# Patient Record
Sex: FEMALE | Race: WHITE | NOT HISPANIC OR LATINO | Employment: UNEMPLOYED | URBAN - METROPOLITAN AREA
[De-identification: names, ages, dates, MRNs, and addresses within clinical notes are randomized per-mention and may not be internally consistent; named-entity substitution may affect disease eponyms.]

---

## 2020-05-20 ENCOUNTER — TRANSCRIBE ORDERS (OUTPATIENT)
Dept: ADMINISTRATIVE | Facility: HOSPITAL | Age: 75
End: 2020-05-20

## 2020-05-20 DIAGNOSIS — R10.9 STOMACH ACHE: Primary | ICD-10-CM

## 2020-05-30 ENCOUNTER — HOSPITAL ENCOUNTER (OUTPATIENT)
Dept: CT IMAGING | Facility: HOSPITAL | Age: 75
Discharge: HOME | End: 2020-05-30
Payer: MEDICARE

## 2020-05-30 DIAGNOSIS — R10.9 STOMACH ACHE: ICD-10-CM

## 2020-05-30 PROCEDURE — 74177 CT ABD & PELVIS W/CONTRAST: CPT

## 2020-05-30 RX ADMIN — IOHEXOL 100 ML: 350 INJECTION, SOLUTION INTRAVENOUS at 11:41

## 2021-03-03 DIAGNOSIS — Z23 ENCOUNTER FOR IMMUNIZATION: ICD-10-CM

## 2021-03-18 ENCOUNTER — IMMUNIZATIONS (OUTPATIENT)
Dept: FAMILY MEDICINE CLINIC | Facility: HOSPITAL | Age: 76
End: 2021-03-18

## 2021-03-18 DIAGNOSIS — Z23 ENCOUNTER FOR IMMUNIZATION: Primary | ICD-10-CM

## 2021-03-18 PROCEDURE — 0001A SARS-COV-2 / COVID-19 MRNA VACCINE (PFIZER-BIONTECH) 30 MCG: CPT

## 2021-03-18 PROCEDURE — 91300 SARS-COV-2 / COVID-19 MRNA VACCINE (PFIZER-BIONTECH) 30 MCG: CPT

## 2021-04-12 ENCOUNTER — IMMUNIZATIONS (OUTPATIENT)
Dept: FAMILY MEDICINE CLINIC | Facility: HOSPITAL | Age: 76
End: 2021-04-12

## 2021-04-12 DIAGNOSIS — Z23 ENCOUNTER FOR IMMUNIZATION: Primary | ICD-10-CM

## 2021-04-12 PROCEDURE — 91300 SARS-COV-2 / COVID-19 MRNA VACCINE (PFIZER-BIONTECH) 30 MCG: CPT

## 2021-04-12 PROCEDURE — 0002A SARS-COV-2 / COVID-19 MRNA VACCINE (PFIZER-BIONTECH) 30 MCG: CPT

## 2023-02-17 LAB — HBA1C MFR BLD HPLC: 5.6 %

## 2023-04-20 PROBLEM — N90.4 LICHEN SCLEROSUS ET ATROPHICUS OF THE VULVA: Status: ACTIVE | Noted: 2023-04-20

## 2023-04-20 PROBLEM — Z12.31 ENCOUNTER FOR SCREENING MAMMOGRAM FOR MALIGNANT NEOPLASM OF BREAST: Status: ACTIVE | Noted: 2023-04-20

## 2023-04-20 PROBLEM — Z01.411 ENCOUNTER FOR GYNECOLOGICAL EXAMINATION WITH ABNORMAL FINDING: Status: ACTIVE | Noted: 2023-04-20

## 2023-04-20 PROBLEM — Z80.3 FAMILY HISTORY OF BREAST CANCER: Status: ACTIVE | Noted: 2023-04-20

## 2023-04-20 PROBLEM — Z78.0 OSTEOPENIA AFTER MENOPAUSE: Status: ACTIVE | Noted: 2023-04-20

## 2023-04-20 PROBLEM — Z78.0 POSTMENOPAUSAL: Status: ACTIVE | Noted: 2023-04-20

## 2023-04-20 PROBLEM — M85.80 OSTEOPENIA AFTER MENOPAUSE: Status: ACTIVE | Noted: 2023-04-20

## 2023-07-20 ENCOUNTER — OFFICE VISIT (OUTPATIENT)
Dept: OBGYN CLINIC | Facility: CLINIC | Age: 78
End: 2023-07-20
Payer: MEDICARE

## 2023-07-20 VITALS
HEIGHT: 61 IN | SYSTOLIC BLOOD PRESSURE: 110 MMHG | WEIGHT: 182.2 LBS | BODY MASS INDEX: 34.4 KG/M2 | DIASTOLIC BLOOD PRESSURE: 68 MMHG

## 2023-07-20 DIAGNOSIS — Z78.0 POSTMENOPAUSAL: ICD-10-CM

## 2023-07-20 DIAGNOSIS — N90.4 LICHEN SCLEROSUS ET ATROPHICUS OF THE VULVA: Primary | ICD-10-CM

## 2023-07-20 PROCEDURE — 99213 OFFICE O/P EST LOW 20 MIN: CPT | Performed by: OBSTETRICS & GYNECOLOGY

## 2023-07-20 RX ORDER — TRIAMCINOLONE ACETONIDE 1 MG/G
CREAM TOPICAL
COMMUNITY
Start: 2023-05-01

## 2023-07-20 NOTE — PATIENT INSTRUCTIONS
If flare up Apply twice daily sparingly  In  3 swipes  For  3  Weeks. Week  4 off. Weeks  5-7 once a day at bedtime, week 8 off, weeks 9-11 every other day and week 12 off. May use Aquaphor between. Now  make sure you apply at least once a day put Aquaphor on! Change your pads at least every 3-4 hours. Keep clean and dry. You need to do maintenance of your  steroid ointment   ! Use  in 3 swipes   2-3 x weekly  3 weeks on one week off.

## 2023-07-20 NOTE — PROGRESS NOTES
PROBLEM GYNECOLOGICAL VISIT    Corwin Baires is a 69 y.o. female who presents today for gyn  Follow up. Her general medical history has been reviewed and she reports it as follows:    Past Medical History:   Diagnosis Date   • Anxiety    • Atrial tachycardia ([address])    • Bundle branch block    • Cataracts, bilateral    • Depression    • H/O gastroesophageal reflux (GERD)    • History of screening mammography 2019    Bi-Rads 1.   • Hypothyroidism    • Lichen sclerosus    • Miscarriage    • Osteopenia     prior tx w  [facility]   • LATRICE (stress urinary incontinence, female)     mild   • Varicella childhood     Past Surgical History:   Procedure Laterality Date   • BREAST BIOPSY     • CATARACT EXTRACTION BILATERAL W/ ANTERIOR VITRECTOMY  2023   •  SECTION N/A    • CHOLECYSTECTOMY N/A 2016   • DILATION AND CURETTAGE OF UTERUS N/A    • TONSILECTOMY AND ADNOIDECTOMY N/A      OB History    No obstetric history on file. Social History     Tobacco Use   • Smoking status: Never   • Smokeless tobacco: Never   Vaping Use   • Vaping Use: Never used   Substance Use Topics   • Alcohol use: Not Currently   • Drug use: Never       Current Outpatient Medications   Medication Instructions   • atorvastatin (LIPITOR) 20 mg tablet TAKE 1 TABLET AT BEDTIME for 90   • Difluprednate 0.05 % EMUL INSTILL 1 DROP INTO LEFT EYE 4 TIMES DAILY FOR 2 WEEKS   • escitalopram (LEXAPRO) 5 mg tablet No dose, route, or frequency recorded. • fluticasone (FLONASE) 50 mcg/act nasal spray As needed   • ketorolac (ACULAR) 0.4 % SOLN No dose, route, or frequency recorded. • levothyroxine 75 mcg tablet No dose, route, or frequency recorded. • meloxicam (MOBIC) 7.5 mg, Oral, Daily PRN   • metoprolol tartrate (LOPRESSOR) 50 mg tablet 2 times daily   • montelukast (SINGULAIR) 10 mg tablet As needed   • pantoprazole (PROTONIX) 40 mg tablet No dose, route, or frequency recorded.    • triamcinolone (KENALOG) 0.1 % cream APPLY TWICE DAILY FOR 2 WEEKS THEN OFF FOR 1 WEEK. REPEAT AS NEEDED TO ECZEMA ON ARMS   • triamcinolone (KENALOG) 0.5 % ointment Topical, 2 times daily, Apply twice daily sparingly  In  3 swipes  For  3  Weeks. Week  4 off. Weeks  5-7 once a day at bedtime, week 8 off, weeks 9-11 every other day and week 12 off. May use Aquaphor between. History of Present Illness:   Pt here for gyn fu + hx of  L/S. Using steroid ointment   When  Sore. Itching and soreness can wax and wane   + rectal incontinence     Review of Systems:  Review of Systems   Gastrointestinal: Negative. Rectal  Incontinence  occasional     Genitourinary: Negative for difficulty urinating, dysuria, vaginal bleeding and vaginal discharge. Vulvar itching     Neurological: Negative. Psychiatric/Behavioral: Negative. Physical Exam:  /68   Ht 5' 0.5" (1.537 m)   Wt 82.6 kg (182 lb 3.2 oz)   Breastfeeding No   BMI 35.00 kg/m²   Physical Exam  Genitourinary:      Bladder, rectum and urethral meatus normal.      No lesions in the vagina. Right Labia: No rash, tenderness, lesions or skin changes. Left Labia: skin changes. Left Labia: No tenderness, lesions or rash. No inguinal adenopathy present in the right side. Pelvic Jose Score: 4/5. No vaginal discharge, erythema, tenderness, bleeding, ulceration or granulation tissue. No vaginal prolapse present. Moderate vaginal atrophy present. No urethral prolapse, tenderness or mass present. Pelvic exam was performed with patient in the lithotomy position. Abdominal:      General: Abdomen is flat. Palpations: Abdomen is soft. Hernia: There is no hernia in the left inguinal area or right inguinal area. Lymphadenopathy:      Lower Body: No right inguinal adenopathy. Neurological:      Mental Status: She is alert.        I have spent a total time of 23 minutes on 07/20/23 in caring for this patient including Risks and benefits of tx options, Instructions for management, Patient and family education, Importance of tx compliance and Documenting in the medical record. Assessment:   1. Post menopausal ,  Lichen sclerosis ,   Pelvic floor relaxation     Plan:  flare up Apply twice daily sparingly  In  3 swipes  For  3  Weeks. Week  4 off. Weeks  5-7 once a day at bedtime, week 8 off, weeks 9-11 every other day and week 12 off. May use Aquaphor between. Now  make sure you apply at least once a day put Aquaphor on! Change your pads at least every 3-4 hours. Keep clean and dry. You need to do maintenance of your  steroid ointment   ! Use  in 3 swipes   2-3 x weekly  3 weeks on one week of  fu in  6 mo  Or  Prn      Reviewed with patient that test results are [facility] immediately, but that they will not necessarily be reviewed by me immediately. Explained that I will review results at my earliest opportunity and contact patient appropriately.

## 2023-09-20 LAB — HBA1C MFR BLD HPLC: 5.7 %

## 2023-09-25 ENCOUNTER — CONSULT (OUTPATIENT)
Age: 78
End: 2023-09-25
Payer: MEDICARE

## 2023-09-25 ENCOUNTER — TELEPHONE (OUTPATIENT)
Age: 78
End: 2023-09-25

## 2023-09-25 VITALS
DIASTOLIC BLOOD PRESSURE: 82 MMHG | WEIGHT: 182.4 LBS | BODY MASS INDEX: 34.44 KG/M2 | HEIGHT: 61 IN | SYSTOLIC BLOOD PRESSURE: 128 MMHG

## 2023-09-25 DIAGNOSIS — R19.4 CHANGE IN BOWEL HABIT: Primary | ICD-10-CM

## 2023-09-25 DIAGNOSIS — Z79.899 ENCOUNTER FOR LONG-TERM CURRENT USE OF MEDICATION: ICD-10-CM

## 2023-09-25 DIAGNOSIS — K62.5 RECTAL BLEEDING: ICD-10-CM

## 2023-09-25 DIAGNOSIS — K21.9 GASTROESOPHAGEAL REFLUX DISEASE WITHOUT ESOPHAGITIS: ICD-10-CM

## 2023-09-25 PROCEDURE — 99203 OFFICE O/P NEW LOW 30 MIN: CPT | Performed by: INTERNAL MEDICINE

## 2023-09-25 NOTE — PATIENT INSTRUCTIONS
[facility] - Outpatient Consultation  Khalida Coker 69 y.o. female MRN: [id number]  Encounter: [id number]    ASSESSMENT AND PLAN:      1. Change in bowel habit  --Patient with a change in bowel habits since about February of this year. She has had some episodes sporadically of fecal incontinence. This has been a new occurrence. Last colonoscopy was about 8 years or so. She is never had polyps. No known family history of colon cancer but patient was adopted. -With about 2-3 loose bowel movements per day. Change in caliber of stool. Differential diagnosis could include microscopic colitis. Patient is on a PPI so this could affect things. Certainly colon neoplasm needs to be excluded    - Colonoscopy; Future  -Difficulties with prep so we will give her MiraLAX prep  -Risk and benefits of colonoscopy reviewed with the patient    2. Rectal bleeding  --Patient with rectal bleeding fairly frequently. She does have discomfort with sitting down. This occurs several times a week. Patient describes scant rectal bleeding. She states "I know I have hemorrhoids. `~"  -Rectal examination today reveals normal tone, no masses, grade 2 internal hemorrhoids      3. Gastroesophageal reflux disease without esophagitis  --On long-term PPI. If she goes off the PPI she has significant heartburn    4. Encounter for long-term current use of medication  --Patient on Mobic for diffuse arthritis along with long-term PPI.   Each of these can affect bowel function  -Protonix can be protective in patients on long-term NSAIDs      Followup Appointment:  4 mo

## 2023-09-25 NOTE — TELEPHONE ENCOUNTER
Scheduled date of colonoscopy (as of today):11/16/23  Physician performing colonoscopy:SKINNY  Location of colonoscopy:[facility]  Bowel prep reviewed with patient:Miralax/Dulcolax  Instructions reviewed with patient by:JANINE  Clearances: N

## 2023-09-25 NOTE — PROGRESS NOTES
[facility] [place] [facility] - Outpatient Consultation  Balwinder Su 69 y.o. female MRN: [id number]  Encounter: [id number]    ASSESSMENT AND PLAN:      1. Change in bowel habit  --Patient with a change in bowel habits since about February of this year. She has had some episodes sporadically of fecal incontinence. This has been a new occurrence. Last colonoscopy was about 8 years or so. She is never had polyps. No known family history of colon cancer but patient was adopted. -With about 2-3 loose bowel movements per day. Change in caliber of stool. Differential diagnosis could include microscopic colitis. Patient is on a PPI so this could affect things. Certainly colon neoplasm needs to be excluded    - Colonoscopy; Future  -Difficulties with prep so we will give her MiraLAX prep  -Risk and benefits of colonoscopy reviewed with the patient    2. Rectal bleeding  --Patient with rectal bleeding fairly frequently. She does have discomfort with sitting down. This occurs several times a week. Patient describes scant rectal bleeding. She states "I know I have hemorrhoids. `~"  -Rectal examination today reveals normal tone, no masses, grade 2 internal hemorrhoids      3. Gastroesophageal reflux disease without esophagitis  --On long-term PPI. If she goes off the PPI she has significant heartburn    4. Encounter for long-term current use of medication  --Patient on Mobic for diffuse arthritis along with long-term PPI. Each of these can affect bowel function  -Protonix can be protective in patients on long-term NSAIDs      Followup Appointment:  4 mo   ______________________________________________________________________    Chief Complaint   Patient presents with   • Diarrhea     Pt states that she started to experience episodes of urgent diarrhea in February. Pt has had diarrhea off/on, not always able to make it to the bathroom. PCP recommended fiber supplement.  Pt also noted her bowel movements are long and thin and she does see fresh blood. HPI:   Uzma Mclean is a 69y.o. year old female who presents for evaluation of recent change in bowel habit and rectal bleeding. Patient reports since the early part of this year specifically February she has noticed some more frequency of her bowel movements. Previously having 1-2 bowel movements a day now 2-3. In addition she reports her stool is looser and she has a sense of urgency. She feels that things are getting better. She has had some episodes of fecal incontinence. In addition patient reports rectal bleeding. This is scant in amount. She denies any problems with abdominal pain. She ports some discomfort with sitting around the anal area and states that "I know I have hemorrhoids". Patient has had colorectal cancer screening. Last examination over 8 years ago which was only revealing for hemorrhoids. Family history largely unknown. Patient knows the history of her birth mother. Patient does have a previous history of GERD. This is controlled with PPI. If she goes off the medication she will have issues.   She is also on Mobic for chronic arthritis problems    Historical Information   Past Medical History:   Diagnosis Date   • Anxiety    • Atrial tachycardia ([address])    • Bundle branch block    • Cataracts, bilateral    • Depression    • H/O gastroesophageal reflux (GERD)    • History of screening mammography 2019    Bi-Rads 1.   • Hypothyroidism    • Irritable bowel syndrome    • Lichen sclerosus    • Miscarriage    • Osteopenia     prior tx w  [facility]   • LATRICE (stress urinary incontinence, female)     mild   • Varicella childhood     Past Surgical History:   Procedure Laterality Date   • BREAST BIOPSY     • CATARACT EXTRACTION BILATERAL W/ ANTERIOR VITRECTOMY  2023   •  SECTION N/A    • CHOLECYSTECTOMY N/A 2016   • COLONOSCOPY N/A    • DILATION AND CURETTAGE OF UTERUS N/A    • TONSILECTOMY AND ADNOIDECTOMY N/A 1960     Social History     Substance and Sexual Activity   Alcohol Use Not Currently     Social History     Substance and Sexual Activity   Drug Use Never     Social History     Tobacco Use   Smoking Status Never   Smokeless Tobacco Never     Family History   Adopted: Yes   Problem Relation Age of Onset   • Uterine cancer Mother 36   • Breast cancer Mother    • Ovarian cancer Neg Hx    • Colon cancer Neg Hx        Meds/Allergies     Current Outpatient Medications:   •  atorvastatin (LIPITOR) 20 mg tablet  •  escitalopram (LEXAPRO) 5 mg tablet  •  levothyroxine 75 mcg tablet  •  meloxicam (MOBIC) 7.5 mg tablet  •  metoprolol tartrate (LOPRESSOR) 50 mg tablet  •  pantoprazole (PROTONIX) 40 mg tablet  •  triamcinolone (KENALOG) 0.1 % cream  •  triamcinolone (KENALOG) 0.5 % ointment  •  fluticasone (FLONASE) 50 mcg/act nasal spray    Allergies   Allergen Reactions   • Penicillins Hives       PHYSICAL EXAM:    Blood pressure 128/82, height 5' 0.5" (1.537 m), weight 82.7 kg (182 lb 6.4 oz), not currently breastfeeding. Body mass index is 35.04 kg/m². General Appearance: NAD, cooperative, alert  Eyes: Anicteric, conjunctiva pink  ENT:  Normocephalic, atraumatic, normal mucosa. Neck:  Supple, symmetrical, trachea midline,   Resp:  Clear to auscultation bilaterally; no rales, rhonchi or wheezing; respirations unlabored   CV:  S1 S2, Regular rate and rhythm; no murmur, rub, or gallop. GI:  Soft, non-tender, non-distended; normal bowel sounds; no masses, no organomegaly   Rectal: Rectal exam no masses. Good tone. Grade 2 internal hemorrhoids  Musculoskeletal: No cyanosis, clubbing or edema. Normal ROM.   Skin:  No jaundice, rashes, or lesions   Heme/Lymph: No palpable cervical lymphadenopathy  Psych: Normal affect, good eye contact  Neuro: No gross deficits, AAOx3    Lab Results:   -Laboratory studies February 2023 CBC normal with a hemoglobin of 14      REVIEW OF SYSTEMS:    CONSTITUTIONAL: Denies any fever, chills, rigors, and weight loss. Positive for fatigue  HEENT: No earache or tinnitus. Denies hearing loss or visual disturbances. CARDIOVASCULAR: No chest pain, positive for palpitations  RESPIRATORY: Observe for recent cough hemoptysis, positive for shortness of breath or dyspnea on exertion--1 flight of steps. GASTROINTESTINAL: As noted in the History of Present Illness. GENITOURINARY: No problems with urination. Denies any hematuria or dysuria. NEUROLOGIC: No dizziness or vertigo, denies headaches. MUSCULOSKELETAL: Diffuse arthritis and arthralgias  SKIN: Denies skin rashes or itching. ENDOCRINE: Denies excessive thirst. Denies intolerance to heat or cold. PSYCHOSOCIAL: Denies depression or anxiety. Denies any recent memory loss.

## 2023-10-26 LAB
CREAT ?TM UR-SCNC: 69 UMOL/L
EXT ALBUMIN URINE RANDOM: 0.6
MICROALBUMIN/CREAT UR: 9 MG/G{CREAT}

## 2023-11-02 ENCOUNTER — ANESTHESIA (OUTPATIENT)
Dept: ANESTHESIOLOGY | Facility: AMBULATORY SURGERY CENTER | Age: 78
End: 2023-11-02

## 2023-11-02 ENCOUNTER — ANESTHESIA EVENT (OUTPATIENT)
Dept: ANESTHESIOLOGY | Facility: AMBULATORY SURGERY CENTER | Age: 78
End: 2023-11-02

## 2023-11-08 ENCOUNTER — TELEPHONE (OUTPATIENT)
Dept: GASTROENTEROLOGY | Facility: CLINIC | Age: 78
End: 2023-11-08

## 2023-11-08 NOTE — TELEPHONE ENCOUNTER
No response from [employer]. Procedure confirmed  Colonoscopy     Via: Spoke with patient. Instructions given: Given to Patient at Visit     Prep Given: Miralax/Dulcolax    Call the office if there are any questions.

## 2023-11-14 ENCOUNTER — NURSE TRIAGE (OUTPATIENT)
Age: 78
End: 2023-11-14

## 2023-11-14 NOTE — TELEPHONE ENCOUNTER
Pt called in with minor cough that started yesterday and she is scheduled for colon 11/16. She denies fever, SOB, or other alarming symptoms. Pt will continue to monitor and call back if anything changes but pt is to proceed with colon as scheduled.

## 2023-11-16 ENCOUNTER — ANESTHESIA EVENT (OUTPATIENT)
Dept: GASTROENTEROLOGY | Facility: AMBULATORY SURGERY CENTER | Age: 78
End: 2023-11-16

## 2023-11-16 ENCOUNTER — ANESTHESIA (OUTPATIENT)
Dept: GASTROENTEROLOGY | Facility: AMBULATORY SURGERY CENTER | Age: 78
End: 2023-11-16

## 2023-11-16 ENCOUNTER — HOSPITAL ENCOUNTER (OUTPATIENT)
Dept: GASTROENTEROLOGY | Facility: AMBULATORY SURGERY CENTER | Age: 78
Discharge: HOME | End: 2023-11-16
Payer: MEDICARE

## 2023-11-16 VITALS
RESPIRATION RATE: 18 BRPM | OXYGEN SATURATION: 96 % | BODY MASS INDEX: 35.34 KG/M2 | WEIGHT: 180 LBS | HEART RATE: 61 BPM | TEMPERATURE: 99.6 F | DIASTOLIC BLOOD PRESSURE: 65 MMHG | SYSTOLIC BLOOD PRESSURE: 141 MMHG | HEIGHT: 60 IN

## 2023-11-16 DIAGNOSIS — R19.4 CHANGE IN BOWEL HABIT: ICD-10-CM

## 2023-11-16 PROBLEM — E03.9 HYPOTHYROIDISM: Status: ACTIVE | Noted: 2023-11-16

## 2023-11-16 PROBLEM — F41.9 ANXIETY: Status: ACTIVE | Noted: 2023-11-16

## 2023-11-16 PROBLEM — I47.19 ATRIAL TACHYCARDIA: Status: ACTIVE | Noted: 2023-11-16

## 2023-11-16 PROCEDURE — 45381 COLONOSCOPY SUBMUCOUS NJX: CPT | Performed by: INTERNAL MEDICINE

## 2023-11-16 PROCEDURE — 45380 COLONOSCOPY AND BIOPSY: CPT | Performed by: INTERNAL MEDICINE

## 2023-11-16 PROCEDURE — 88305 TISSUE EXAM BY PATHOLOGIST: CPT | Performed by: PATHOLOGY

## 2023-11-16 PROCEDURE — 45385 COLONOSCOPY W/LESION REMOVAL: CPT | Performed by: INTERNAL MEDICINE

## 2023-11-16 RX ORDER — SODIUM CHLORIDE, SODIUM LACTATE, POTASSIUM CHLORIDE, CALCIUM CHLORIDE 600; 310; 30; 20 MG/100ML; MG/100ML; MG/100ML; MG/100ML
50 INJECTION, SOLUTION INTRAVENOUS CONTINUOUS
Status: DISCONTINUED | OUTPATIENT
Start: 2023-11-16 | End: 2023-11-20 | Stop reason: HOSPADM

## 2023-11-16 RX ORDER — SODIUM CHLORIDE, SODIUM LACTATE, POTASSIUM CHLORIDE, CALCIUM CHLORIDE 600; 310; 30; 20 MG/100ML; MG/100ML; MG/100ML; MG/100ML
INJECTION, SOLUTION INTRAVENOUS CONTINUOUS PRN
Status: DISCONTINUED | OUTPATIENT
Start: 2023-11-16 | End: 2023-11-16

## 2023-11-16 RX ORDER — PROPOFOL 10 MG/ML
INJECTION, EMULSION INTRAVENOUS AS NEEDED
Status: DISCONTINUED | OUTPATIENT
Start: 2023-11-16 | End: 2023-11-16

## 2023-11-16 RX ADMIN — PROPOFOL 50 MG: 10 INJECTION, EMULSION INTRAVENOUS at 13:13

## 2023-11-16 RX ADMIN — PROPOFOL 100 MG: 10 INJECTION, EMULSION INTRAVENOUS at 13:05

## 2023-11-16 RX ADMIN — SODIUM CHLORIDE, SODIUM LACTATE, POTASSIUM CHLORIDE, CALCIUM CHLORIDE 50 ML/HR: 600; 310; 30; 20 INJECTION, SOLUTION INTRAVENOUS at 12:56

## 2023-11-16 RX ADMIN — PROPOFOL 50 MG: 10 INJECTION, EMULSION INTRAVENOUS at 13:07

## 2023-11-16 RX ADMIN — SODIUM CHLORIDE, SODIUM LACTATE, POTASSIUM CHLORIDE, CALCIUM CHLORIDE: 600; 310; 30; 20 INJECTION, SOLUTION INTRAVENOUS at 13:03

## 2023-11-16 RX ADMIN — PROPOFOL 50 MG: 10 INJECTION, EMULSION INTRAVENOUS at 13:09

## 2023-11-16 NOTE — ANESTHESIA POSTPROCEDURE EVALUATION
Post-Op Assessment Note    CV Status:  Stable    Pain management: adequate       Mental Status:  Alert and awake   Hydration Status:  Euvolemic   PONV Controlled:  Controlled   Airway Patency:  Patent     Post Op Vitals Reviewed: Yes    No anethesia notable event occurred.     Staff: Anesthesiologist, CRNA               BP   135/63   Temp      Pulse  65   Resp   14   SpO2   100

## 2023-11-16 NOTE — ANESTHESIA PREPROCEDURE EVALUATION
Procedure:  COLONOSCOPY    Relevant Problems   CARDIO   (+) Atrial tachycardia      ENDO   (+) Hypothyroidism      GI/HEPATIC   (+) Gastroesophageal reflux disease without esophagitis   (+) Rectal bleeding      NEURO/PSYCH   (+) Anxiety        Physical Exam    Airway    Mallampati score: III  TM Distance: >3 FB  Neck ROM: full     Dental   No notable dental hx     Cardiovascular  Cardiovascular exam normal    Pulmonary  Pulmonary exam normal     Other Findings  post-pubertal.      Anesthesia Plan  ASA Score- 2     Anesthesia Type- IV sedation with anesthesia with ASA Monitors. Additional Monitors:     Airway Plan:     Comment: I discussed risks (reviewed with patient on the anesthesia consent form), benefits and alternatives of monitored sedation including the possibility under sedation to have recall or mild discomfort. .       Plan Factors-    Chart reviewed. Patient summary reviewed. Induction- intravenous. Postoperative Plan-     Informed Consent- Anesthetic plan and risks discussed with patient. I personally reviewed this patient with the CRNA. Discussed and agreed on the Anesthesia Plan with the CRNA. Cecily Raygoza

## 2023-11-16 NOTE — PROGRESS NOTES
Procedure results reviewed with patient by Dr Arlette Rosas. Pt given written and verbal d/c  instructions.

## 2023-11-16 NOTE — H&P
History and Physical - [facility]  Tavo Hartman 69 y.o. female MRN: [id number]    HPI: Tavo Hartman is a 69y.o. year old female who presents for diagnostic colonoscopy. Patient has recent change in bowel habit and recent rectal bleeding    REVIEW OF SYSTEMS: Per the HPI, and otherwise unremarkable. Historical Information   Past Medical History:   Diagnosis Date    Anxiety     Atrial tachycardia     Bundle branch block     Cataracts, bilateral     Depression     Disease of thyroid gland     GERD (gastroesophageal reflux disease)     H/O gastroesophageal reflux (GERD)     History of screening mammography 2019    Bi-Rads 1.     Hypothyroidism     Irritable bowel syndrome     Lichen sclerosus     Miscarriage     Osteopenia     prior tx w  [facility]    LATRICE (stress urinary incontinence, female)     mild    Varicella childhood     Past Surgical History:   Procedure Laterality Date    BREAST BIOPSY      CATARACT EXTRACTION      CATARACT EXTRACTION BILATERAL W/ ANTERIOR VITRECTOMY  2023     SECTION N/A     CHOLECYSTECTOMY N/A 2016    COLONOSCOPY N/A     DILATION AND CURETTAGE OF UTERUS N/A     TONSILECTOMY AND ADNOIDECTOMY N/A      Social History   Social History     Substance and Sexual Activity   Alcohol Use Not Currently     Social History     Substance and Sexual Activity   Drug Use Never     Social History     Tobacco Use   Smoking Status Never   Smokeless Tobacco Never     Family History   Adopted: Yes   Problem Relation Age of Onset    Uterine cancer Mother 36    Breast cancer Mother     Ovarian cancer Neg Hx     Colon cancer Neg Hx        Meds/Allergies       Current Outpatient Medications:     atorvastatin (LIPITOR) 20 mg tablet    escitalopram (LEXAPRO) 5 mg tablet    levothyroxine 75 mcg tablet    meloxicam (MOBIC) 7.5 mg tablet    metoprolol tartrate (LOPRESSOR) 50 mg tablet    pantoprazole (PROTONIX) 40 mg tablet    fluticasone (FLONASE) 50 mcg/act nasal spray    triamcinolone (KENALOG) 0.1 % cream    triamcinolone (KENALOG) 0.5 % ointment    Current Facility-Administered Medications:     lactated ringers infusion, 50 mL/hr, Intravenous, Continuous    Allergies   Allergen Reactions    Penicillins Hives       Objective     There were no vitals taken for this visit. PHYSICAL EXAM    Gen: NAD AAOx3  Head: Normocephalic, Atraumatic  CV: S1S2 RRR no m/r/g  CHEST: Clear b/l no c/r/w  ABD: soft, +BS NT/ND  EXT: no edema    ASSESSMENT/PLAN:  This is a 69y.o. year old female here for diagnostic colonoscopy, and she is stable and optimized for her procedure.

## 2023-11-22 PROCEDURE — 88305 TISSUE EXAM BY PATHOLOGIST: CPT | Performed by: PATHOLOGY

## 2023-11-22 NOTE — RESULT ENCOUNTER NOTE
Called the patient and reviewed path. Large polyp on the stalk total length 3 cm. No cancer or dysplasia. It appears margins are clean. No microscopic colitis. Recall exam for 2 years.   Discussed with patient at time of office visit in January please copy patient's PCP

## 2024-01-10 ENCOUNTER — OFFICE VISIT (OUTPATIENT)
Age: 79
End: 2024-01-10
Payer: MEDICARE

## 2024-01-10 VITALS
BODY MASS INDEX: 35.26 KG/M2 | SYSTOLIC BLOOD PRESSURE: 122 MMHG | DIASTOLIC BLOOD PRESSURE: 68 MMHG | HEIGHT: 60 IN | WEIGHT: 179.6 LBS

## 2024-01-10 DIAGNOSIS — K59.00 CONSTIPATION, UNSPECIFIED CONSTIPATION TYPE: Primary | ICD-10-CM

## 2024-01-10 DIAGNOSIS — Z86.010 PERSONAL HISTORY OF COLONIC POLYPS: ICD-10-CM

## 2024-01-10 DIAGNOSIS — K62.5 RECTAL BLEEDING: ICD-10-CM

## 2024-01-10 DIAGNOSIS — K64.8 INTERNAL HEMORRHOIDS: ICD-10-CM

## 2024-01-10 PROCEDURE — 99213 OFFICE O/P EST LOW 20 MIN: CPT | Performed by: INTERNAL MEDICINE

## 2024-01-10 NOTE — PROGRESS NOTES
[facility] - Outpatient Follow-up Note  Noreen Fiore 78 y.o. female MRN: [id number]  Encounter: [id number]    ASSESSMENT AND PLAN:      1. Constipation, unspecified constipation type  With recent history of constipation.  This has been associated with some rectal bleeding.  Really had not been an issue prior.  Patient's bowel habit improved after removal of her large sigmoid polyp.  -Increase fiber in the diet will provide diet.  Kiwi fruit x 2 can be helpful  -Drink a liter to a liter and a half of fluid per day.  1 apple a day can be helpful  -Resume Metamucil Gummies    -If not effective then try MiraLAX 17 g daily.  Patient's insurer does not cover.  Can do prior authorization if patient needs this ongoing      2. Rectal bleeding  --1 episode of rectal bleeding with straining at the stool yesterday.  Patient does have second-degree hemorrhoids.  Will observe  -If worse patient can call for topical prescription based steroids    3. Personal history of colonic polyps  -Large 3 cm polyp removed.  Negative margins.  Follow-up 2 years    4. Internal hemorrhoids  -Second-degree hemorrhoids on exam.  If bleeding could continue could consider hemorrhoid banding      Followup Appointment: 6 months   ______________________________________________________________________    Chief Complaint   Patient presents with    Follow-up     Pt states she is experiencing a little constipation.     HPI: Patient returns to the office for follow-up after recent colonoscopy.  We evaluated the patient for diarrhea and change in bowel habit along with rectal bleeding.  Colonoscopy performed in November revealed diverticulosis and a large sigmoid colon polyp.  It measured 3 cm.  Happily it was on a stalk and were able to remove the polyp endoscopically.  There is no residual polyp at the resection line.  Patient reports nearly immediate improvement of her bowel habit after the procedure.  Specifically no  longer having constipation or diarrhea or rectal bleeding.  Patient also had grade 2 internal hemorrhoids at time of procedure.  She does report that she has gotten a little constipated in the last few days.  She had to strain the stool yesterday and had some rectal bleeding.  Note was made of internal hemorrhoids at the time of exam.  She has taken Metamucil Gummies in the past for problems with diarrhea and still has some on hand.    Historical Information   Past Medical History:   Diagnosis Date    Anxiety     Atrial tachycardia     Bundle branch block     Cataracts, bilateral     Depression     Disease of thyroid gland     GERD (gastroesophageal reflux disease)     H/O gastroesophageal reflux (GERD)     History of screening mammography 2019    Bi-Rads 1.    Hypothyroidism     Irritable bowel syndrome     Lichen sclerosus     Miscarriage     Osteopenia     prior tx w  [facility]    LATRICE (stress urinary incontinence, female)     mild    Varicella childhood     Past Surgical History:   Procedure Laterality Date    BREAST BIOPSY      CATARACT EXTRACTION      CATARACT EXTRACTION BILATERAL W/ ANTERIOR VITRECTOMY  2023     SECTION N/A     CHOLECYSTECTOMY N/A 2016    COLONOSCOPY N/A     DILATION AND CURETTAGE OF UTERUS N/A     TONSILECTOMY AND ADNOIDECTOMY N/A      Social History     Substance and Sexual Activity   Alcohol Use Not Currently     Social History     Substance and Sexual Activity   Drug Use Never     Social History     Tobacco Use   Smoking Status Never    Passive exposure: Never   Smokeless Tobacco Never     Family History   Adopted: Yes   Problem Relation Age of Onset    Uterine cancer Mother 40    Breast cancer Mother 56    Ovarian cancer Neg Hx     Colon cancer Neg Hx          Current Outpatient Medications:     atorvastatin (LIPITOR) 20 mg tablet    escitalopram (LEXAPRO) 5 mg tablet    levothyroxine 75 mcg tablet    metoprolol tartrate (LOPRESSOR) 50 mg tablet     pantoprazole (PROTONIX) 40 mg tablet    triamcinolone (KENALOG) 0.5 % ointment    fluticasone (FLONASE) 50 mcg/act nasal spray  Allergies   Allergen Reactions    Penicillins Hives     Reviewed medications and allergies and updated as indicated    PHYSICAL EXAM:    Blood pressure 122/68, height 5' (1.524 m), weight 81.5 kg (179 lb 9.6 oz), not currently breastfeeding. Body mass index is 35.08 kg/m².  General Appearance: NAD, cooperative, alert  Eyes: Anicteric, conjunctiva pink  ENT:  Normocephalic, atraumatic, normal mucosa.    Neck:  Supple, symmetrical, trachea midline  Resp:  Clear to auscultation bilaterally; no rales, rhonchi or wheezing; respirations unlabored   CV:  S1 S2, Regular rate and rhythm; no murmur, rub, or gallop.  GI:  Soft, non-tender, non-distended; normal bowel sounds; no masses, no organomegaly   Rectal: Deferred  Musculoskeletal: No cyanosis, clubbing or edema. Normal ROM.  Skin:  No jaundice, rashes, or lesions   Heme/Lymph: No palpable cervical lymphadenopathy  Psych: Normal affect, good eye contact  Neuro: No gross deficits, AAOx3

## 2024-01-10 NOTE — LETTER
January 10, 2024     Federico Vargas MD  [address] [facility] [address]    Patient: Noreen Fiore   YOB: 1945   Date of Visit: 1/10/2024       Dear Dr. Vargas:    Thank you for referring Noreen Fiore to me for evaluation. Below are my notes for this consultation.    If you have questions, please do not hesitate to call me. I look forward to following your patient along with you.         Sincerely,        Pancho Fuentes MD        CC: No Recipients    Pancho Fuentes MD  1/10/2024 10:56 AM  Sign when Signing Visit  [facility] - Outpatient Follow-up Note  Noreen Fiore 78 y.o. female MRN: [id number]  Encounter: [id number]    ASSESSMENT AND PLAN:      1. Constipation, unspecified constipation type  With recent history of constipation.  This has been associated with some rectal bleeding.  Really had not been an issue prior.  Patient's bowel habit improved after removal of her large sigmoid polyp.  -Increase fiber in the diet will provide diet.  Kiwi fruit x 2 can be helpful  -Drink a liter to a liter and a half of fluid per day.  1 apple a day can be helpful  -Resume Metamucil Gummies    -If not effective then try MiraLAX 17 g daily.  Patient's insurer does not cover.  Can do prior authorization if patient needs this ongoing      2. Rectal bleeding  --1 episode of rectal bleeding with straining at the stool yesterday.  Patient does have second-degree hemorrhoids.  Will observe  -If worse patient can call for topical prescription based steroids    3. Personal history of colonic polyps  -Large 3 cm polyp removed.  Negative margins.  Follow-up 2 years    4. Internal hemorrhoids  -Second-degree hemorrhoids on exam.  If bleeding could continue could consider hemorrhoid banding      Followup Appointment: 6 months   ______________________________________________________________________    Chief Complaint   Patient presents with   • Follow-up     Pt states she  is experiencing a little constipation.     HPI:  ***    Historical Information  Past Medical History:   Diagnosis Date   • Anxiety    • Atrial tachycardia    • Bundle branch block    • Cataracts, bilateral    • Depression    • Disease of thyroid gland    • GERD (gastroesophageal reflux disease)    • H/O gastroesophageal reflux (GERD)    • History of screening mammography 2019    Bi-Rads 1.   • Hypothyroidism    • Irritable bowel syndrome    • Lichen sclerosus    • Miscarriage    • Osteopenia     prior tx w  Evista   • LATRICE (stress urinary incontinence, female)     mild   • Varicella childhood     Past Surgical History:   Procedure Laterality Date   • BREAST BIOPSY     • CATARACT EXTRACTION     • CATARACT EXTRACTION BILATERAL W/ ANTERIOR VITRECTOMY  2023   •  SECTION N/A    • CHOLECYSTECTOMY N/A 2016   • COLONOSCOPY N/A    • DILATION AND CURETTAGE OF UTERUS N/A    • TONSILECTOMY AND ADNOIDECTOMY N/A      Social History     Substance and Sexual Activity   Alcohol Use Not Currently     Social History     Substance and Sexual Activity   Drug Use Never     Social History     Tobacco Use   Smoking Status Never   • Passive exposure: Never   Smokeless Tobacco Never     Family History   Adopted: Yes   Problem Relation Age of Onset   • Uterine cancer Mother 40   • Breast cancer Mother 56   • Ovarian cancer Neg Hx    • Colon cancer Neg Hx          Current Outpatient Medications:   •  atorvastatin (LIPITOR) 20 mg tablet  •  escitalopram (LEXAPRO) 5 mg tablet  •  levothyroxine 75 mcg tablet  •  metoprolol tartrate (LOPRESSOR) 50 mg tablet  •  pantoprazole (PROTONIX) 40 mg tablet  •  triamcinolone (KENALOG) 0.5 % ointment  •  fluticasone (FLONASE) 50 mcg/act nasal spray  Allergies   Allergen Reactions   • Penicillins Hives     Reviewed medications and allergies and updated as indicated    PHYSICAL EXAM:    Blood pressure 122/68, height 5' (1.524 m), weight 81.5 kg (179 lb 9.6 oz), not  "currently breastfeeding. Body mass index is 35.08 kg/m².  General Appearance: NAD, cooperative, alert  Eyes: Anicteric, conjunctiva pink  ENT:  Normocephalic, atraumatic, normal mucosa.    Neck:  Supple, symmetrical, trachea midline  Resp:  Clear to auscultation bilaterally; no rales, rhonchi or wheezing; respirations unlabored   CV:  S1 S2, Regular rate and rhythm; no murmur, rub, or gallop.  GI:  Soft, non-tender, non-distended; normal bowel sounds; no masses, no organomegaly   Rectal: Deferred  Musculoskeletal: No cyanosis, clubbing or edema. Normal ROM.  Skin:  No jaundice, rashes, or lesions   Heme/Lymph: No palpable cervical lymphadenopathy  Psych: Normal affect, good eye contact  Neuro: No gross deficits, AAOx3    Lab Results:   No results found for: \"WBC\", \"HGB\", \"HCT\", \"MCV\", \"PLT\"  No results found for: \"NA\", \"K\", \"CL\", \"CO2\", \"ANIONGAP\", \"BUN\", \"CREATININE\", \"GLUCOSE\", \"GLUF\", \"CALCIUM\", \"CORRECTEDCA\", \"AST\", \"ALT\", \"ALKPHOS\", \"PROT\", \"BILITOT\", \"EGFR\"    Radiology Results:   No results found.    "

## 2024-01-10 NOTE — PATIENT INSTRUCTIONS
[facility] - Outpatient Follow-up Note  Noreen Fiore 78 y.o. female MRN: [id number]  Encounter: [id number]    ASSESSMENT AND PLAN:      1. Constipation, unspecified constipation type  With recent history of constipation.  This has been associated with some rectal bleeding.  Really had not been an issue prior.  Patient's bowel habit improved after removal of her large sigmoid polyp.  -Increase fiber in the diet will provide diet.  Kiwi fruit x 2 can be helpful  -Drink a liter to a liter and a half of fluid per day.  1 apple a day can be helpful  -Resume Metamucil Gummies    -If not effective then try MiraLAX 17 g daily.  Patient's insurer does not cover.  Can do prior authorization if patient needs this ongoing      2. Rectal bleeding  --1 episode of rectal bleeding with straining at the stool yesterday.  Patient does have second-degree hemorrhoids.  Will observe  -If worse patient can call for topical prescription based steroids    3. Personal history of colonic polyps  -Large 3 cm polyp removed.  Negative margins.  Follow-up 2 years    4. Internal hemorrhoids  -Second-degree hemorrhoids on exam.  If bleeding could continue could consider hemorrhoid banding      Followup Appointment: 6 months

## 2024-07-30 ENCOUNTER — OFFICE VISIT (OUTPATIENT)
Age: 79
End: 2024-07-30
Payer: MEDICARE

## 2024-07-30 VITALS
WEIGHT: 180.2 LBS | SYSTOLIC BLOOD PRESSURE: 120 MMHG | DIASTOLIC BLOOD PRESSURE: 72 MMHG | BODY MASS INDEX: 35.38 KG/M2 | HEIGHT: 60 IN

## 2024-07-30 DIAGNOSIS — Z86.010 HISTORY OF COLON POLYPS: ICD-10-CM

## 2024-07-30 DIAGNOSIS — K59.00 CONSTIPATION, UNSPECIFIED CONSTIPATION TYPE: ICD-10-CM

## 2024-07-30 DIAGNOSIS — K21.9 GASTROESOPHAGEAL REFLUX DISEASE, UNSPECIFIED WHETHER ESOPHAGITIS PRESENT: Primary | ICD-10-CM

## 2024-07-30 PROCEDURE — 99213 OFFICE O/P EST LOW 20 MIN: CPT | Performed by: INTERNAL MEDICINE

## 2024-07-30 PROCEDURE — G2211 COMPLEX E/M VISIT ADD ON: HCPCS | Performed by: INTERNAL MEDICINE

## 2024-07-30 RX ORDER — OMEPRAZOLE 40 MG/1
40 CAPSULE, DELAYED RELEASE ORAL DAILY
COMMUNITY

## 2024-07-30 RX ORDER — FAMOTIDINE 40 MG/1
40 TABLET, FILM COATED ORAL
Qty: 90 TABLET | Refills: 2 | Status: SHIPPED | OUTPATIENT
Start: 2024-07-30 | End: 2025-04-26

## 2024-07-30 NOTE — PATIENT INSTRUCTIONS
[facility] - Outpatient Follow-up Note  Noreen Fiore 78 y.o. female MRN: [id number]  Encounter: [id number]    ASSESSMENT AND PLAN:      1. Gastroesophageal reflux disease, unspecified whether esophagitis present  --Recent history of increasing GERD symptoms.  Had had a upper endoscopy in the remote past.  Records are not available.  No trouble swallowing at this time.  Is on omeprazole but takes it in the morning.  Most of her symptoms are in the evening.  Advised to take omeprazole 40 mg 1/2-hour before lunch or dinner.  Can take famotidine for breakthrough symptoms    check records in EMR on remote EGD     - famotidine (PEPCID) 40 MG tablet; Take 1 tablet (40 mg total) by mouth daily at bedtime as needed for heartburn  Dispense: 90 tablet; Refill: 2    2. History of colon polyps  -Patient with a large adenomatous polyp removed fall 2023.  Polyp was over 2 cm.  Margins were clean however.  Recall exam for 2025    3. Constipation, unspecified constipation type  --Bowel problems and constipation resolved after the polypectomy.  Not having to take any laxatives at all at this time      Followup Appointment: 8 mo

## 2024-07-30 NOTE — PROGRESS NOTES
[facility] - Outpatient Follow-up Note  Noreen Fiore 78 y.o. female MRN: [id number]  Encounter: [id number]    ASSESSMENT AND PLAN:      1. Gastroesophageal reflux disease, unspecified whether esophagitis present  --Recent history of increasing GERD symptoms.  Had had a upper endoscopy in the remote past.  Records are not available.  No trouble swallowing at this time.  Is on omeprazole but takes it in the morning.  Most of her symptoms are in the evening.  Advised to take omeprazole 40 mg 1/2-hour before lunch or dinner.  Can take famotidine for breakthrough symptoms    - famotidine (PEPCID) 40 MG tablet; Take 1 tablet (40 mg total) by mouth daily at bedtime as needed for heartburn  Dispense: 90 tablet; Refill: 2  - check records in EMR on remote EGD     2. History of colon polyps  -Patient with a large adenomatous polyp removed fall 2023.  Polyp was over 2 cm.  Margins were clean however.  Recall exam for 2025    3. Constipation, unspecified constipation type  --Bowel problems and constipation resolved after the polypectomy.  Not having to take any laxatives at all at this time      Followup Appointment: 8 mo  ______________________________________________________________________    Chief Complaint   Patient presents with    Follow-up     Pt is experiencing reflux symptoms off/on. She noted she is taking omeprazole once daily in the morning. Constipation has resolved.     HPI: Patient returns to the office for routine follow-up.  Her major complaint presently is some increasing problems with reflux.  This is typically worse in the afternoon and evening hours.  She denies any issues with swallowing problems.  Symptoms are not all that bad but somewhat annoying.  She takes omeprazole in the morning but does not really eat much in the way of breakfast.  Patient does report she had an EGD in the remote past when she had trouble swallowing.  This was done through our practice but  records are not available in our scanned in EMR.  We saw the patient last fall for problems with constipation and change in bowel habit.  She underwent colonoscopy which revealed moderate sigmoid diverticulosis.  She also had a large 30 mm polyp in length which was removed with snare cautery.  Happily margins were clean of any adenomatous tissue.  This was in the sigmoid colon.  Interestingly after she had the polypectomy her symptoms of constipation and bowel issues resolved completely    Historical Information   Past Medical History:   Diagnosis Date    Anxiety     Atrial tachycardia     Bundle branch block     Cataracts, bilateral     Depression     Disease of thyroid gland     GERD (gastroesophageal reflux disease)     H/O gastroesophageal reflux (GERD)     History of screening mammography 2019    Bi-Rads 1.    Hypothyroidism     Irritable bowel syndrome     Lichen sclerosus     Miscarriage     Osteopenia     prior tx w  [facility]    LATRICE (stress urinary incontinence, female)     mild    Varicella childhood     Past Surgical History:   Procedure Laterality Date    BREAST BIOPSY      CATARACT EXTRACTION      CATARACT EXTRACTION BILATERAL W/ ANTERIOR VITRECTOMY  2023     SECTION N/A     CHOLECYSTECTOMY N/A 2016    COLONOSCOPY N/A     DILATION AND CURETTAGE OF UTERUS N/A     TONSILECTOMY AND ADNOIDECTOMY N/A      Social History     Substance and Sexual Activity   Alcohol Use Not Currently     Social History     Substance and Sexual Activity   Drug Use Never     Social History     Tobacco Use   Smoking Status Never    Passive exposure: Never   Smokeless Tobacco Never     Family History   Adopted: Yes   Problem Relation Age of Onset    Uterine cancer Mother 40    Breast cancer Mother 56    Ovarian cancer Neg Hx     Colon cancer Neg Hx          Current Outpatient Medications:     escitalopram (LEXAPRO) 5 mg tablet    famotidine (PEPCID) 40 MG tablet    levothyroxine 75 mcg tablet     metoprolol tartrate (LOPRESSOR) 50 mg tablet    omeprazole (PriLOSEC) 40 MG capsule    triamcinolone (KENALOG) 0.5 % ointment  Allergies   Allergen Reactions    Penicillins Hives     Reviewed medications and allergies and updated as indicated    PHYSICAL EXAM:    Blood pressure 120/72, height 5' (1.524 m), weight 81.7 kg (180 lb 3.2 oz), not currently breastfeeding. Body mass index is 35.19 kg/m².  General Appearance: NAD, cooperative, alert  Eyes: Anicteric, conjunctiva pink  ENT:  Normocephalic, atraumatic, normal mucosa.   Back mild kyphosis  Neck:  Supple, symmetrical, trachea midline  Resp:  Clear to auscultation bilaterally; no rales, rhonchi or wheezing; respirations unlabored   CV:  S1 S2, Regular rate and rhythm; no murmur, rub, or gallop.  GI:  Soft, non-tender, non-distended; normal bowel sounds; no masses, no organomegaly   Rectal: Deferred  Musculoskeletal: No cyanosis, clubbing or edema. Normal ROM.  Skin:  No jaundice, rashes, or lesions   Heme/Lymph: No palpable cervical lymphadenopathy  Psych: Normal affect, good eye contact  Neuro: No gross deficits, AAOx3    Old records reviewed.  EGD 2015 -1 cm hiatal hernia.  No esophagitis or Mendez's.  Benign biopsies of the esophagus.  Mild to moderate gastritis.

## 2024-08-16 ENCOUNTER — OFFICE VISIT (OUTPATIENT)
Dept: OBGYN CLINIC | Facility: CLINIC | Age: 79
End: 2024-08-16
Payer: MEDICARE

## 2024-08-16 VITALS
HEIGHT: 61 IN | WEIGHT: 182.2 LBS | DIASTOLIC BLOOD PRESSURE: 60 MMHG | BODY MASS INDEX: 34.4 KG/M2 | SYSTOLIC BLOOD PRESSURE: 110 MMHG

## 2024-08-16 DIAGNOSIS — N90.4 LICHEN SCLEROSUS ET ATROPHICUS OF THE VULVA: Primary | ICD-10-CM

## 2024-08-16 PROCEDURE — 99213 OFFICE O/P EST LOW 20 MIN: CPT | Performed by: OBSTETRICS & GYNECOLOGY

## 2024-08-16 RX ORDER — BETAMETHASONE DIPROPIONATE 0.5 MG/G
OINTMENT, AUGMENTED TOPICAL
Qty: 15 G | Refills: 1 | Status: SHIPPED | OUTPATIENT
Start: 2024-08-16

## 2024-08-16 RX ORDER — METOPROLOL TARTRATE 25 MG/1
TABLET, FILM COATED ORAL
COMMUNITY
Start: 2024-07-10

## 2024-08-16 NOTE — ASSESSMENT & PLAN NOTE
Increase in vulvar irritation. Stopped using steroid cream regularly.   On exam minimal LS noted.   Recommend betamethasone ointment daily for 7 days and then weekly to maintain.   Aquafor recommended daily to BID.   Domeboro soaks prn irritation.  RTO well check, prn.

## 2024-08-16 NOTE — PROGRESS NOTES
Assessment/Plan:    Lichen sclerosus et atrophicus of the vulva  Increase in vulvar irritation. Stopped using steroid cream regularly.   On exam minimal LS noted.   Recommend betamethasone ointment daily for 7 days and then weekly to maintain.   Aquafor recommended daily to BID.   Domeboro soaks prn irritation.  RTO well check, prn.       Diagnoses and all orders for this visit:    Lichen sclerosus et atrophicus of the vulva  -     betamethasone, augmented, (DIPROLENE) 0.05 % ointment; Pea sized application daily for 7 days when irritated. Once weekly to maintain.    Other orders  -     metoprolol tartrate (LOPRESSOR) 25 mg tablet          Subjective:      Patient ID: Noreen Fiore is a 78 y.o. female.    HPI    The following portions of the patient's history were reviewed and updated as appropriate: She  has a past medical history of Anxiety, Atrial tachycardia, Bundle branch block, Cataracts, bilateral, Depression, Disease of thyroid gland, GERD (gastroesophageal reflux disease), H/O gastroesophageal reflux (GERD), History of screening mammography (07/24/2019), Hypothyroidism, Irritable bowel syndrome, Lichen sclerosus, Miscarriage (1979), Osteopenia, LATRICE (stress urinary incontinence, female), and Varicella (childhood).  She   Patient Active Problem List    Diagnosis Date Noted    Hypothyroidism 11/16/2023    Atrial tachycardia 11/16/2023    Anxiety 11/16/2023    Change in bowel habit 09/25/2023    Rectal bleeding 09/25/2023    Gastroesophageal reflux disease without esophagitis 09/25/2023    Lichen sclerosus et atrophicus of the vulva 04/20/2023    Osteopenia after menopause 04/20/2023    Postmenopausal 04/20/2023    Family history of breast cancer 04/20/2023    Encounter for screening mammogram for malignant neoplasm of breast 04/20/2023    Encounter for gynecological examination with abnormal finding 04/20/2023     She  has a past surgical history that includes TONSILECTOMY AND ADNOIDECTOMY (N/A, 1960); Dilation  "and curettage of uterus (N/A, );  section (N/A, ); Cholecystectomy (N/A, 2016); Breast biopsy (); Cataract extraction bilateral w/ anterior vitrectomy (2023); Colonoscopy (N/A); and Cataract extraction.  Her family history includes Breast cancer (age of onset: 56) in her mother; Uterine cancer (age of onset: 40) in her mother. She was adopted.  She  reports that she has never smoked. She has never been exposed to tobacco smoke. She has never used smokeless tobacco. She reports that she does not currently use alcohol. She reports that she does not use drugs.  Current Outpatient Medications   Medication Sig Dispense Refill    betamethasone, augmented, (DIPROLENE) 0.05 % ointment Pea sized application daily for 7 days when irritated. Once weekly to maintain. 15 g 1    escitalopram (LEXAPRO) 5 mg tablet       famotidine (PEPCID) 40 MG tablet Take 1 tablet (40 mg total) by mouth daily at bedtime as needed for heartburn 90 tablet 2    levothyroxine 75 mcg tablet       metoprolol tartrate (LOPRESSOR) 25 mg tablet       omeprazole (PriLOSEC) 40 MG capsule Take 40 mg by mouth daily       No current facility-administered medications for this visit.     She is allergic to penicillins..    Review of Systems  +vaginal irritation, no PMB    Objective:      /60 (BP Location: Left arm, Patient Position: Sitting, Cuff Size: Large)   Ht 5' 0.5\" (1.537 m)   Wt 82.6 kg (182 lb 3.2 oz)   BMI 35.00 kg/m²          Physical Exam    Appears well, no apparent distress.   Does not appear anxious or depressed.  Pelvic exam: atrophic external genitalia with minimal WE and dry skin noted at apex of labia majora and perineal body. No raised or suspicious lesions. Normal urethral meatus  "

## 2024-08-16 NOTE — PATIENT INSTRUCTIONS
Continued Stay Note  [facility]     Patient Name: Nicanor Haley  MRN: [id number]  Today's Date: 12/20/2017    Admit Date: 12/17/2017          Discharge Plan       12/20/17 1606    Case Management/Social Work Plan    Plan [facility], pending precert    Additional Comments Spoke with Tricia with [facility] who states they are able to accept, precert pending. Spoke with pt, pt agreeable with d/c plan. Plan will be to d/c to [facility] when precert obtained.              Discharge Codes     None            Madhuri Faye RN     Use the steroid cream daily for 7 days and then once per week to keep it at bay.     Use the Aquafor 1-2 times per day.   Do not use witch hazel preps.     For burning if needed you can try domeboro soaks.

## 2024-12-05 DIAGNOSIS — N90.4 LICHEN SCLEROSUS ET ATROPHICUS OF THE VULVA: ICD-10-CM

## 2024-12-05 RX ORDER — BETAMETHASONE DIPROPIONATE 0.5 MG/G
OINTMENT, AUGMENTED TOPICAL
Qty: 15 G | Refills: 11 | Status: SHIPPED | OUTPATIENT
Start: 2024-12-05

## 2025-05-15 ENCOUNTER — ANNUAL EXAM (OUTPATIENT)
Dept: OBGYN CLINIC | Facility: CLINIC | Age: 80
End: 2025-05-15
Payer: MEDICARE

## 2025-05-15 VITALS
HEIGHT: 61 IN | BODY MASS INDEX: 33.23 KG/M2 | SYSTOLIC BLOOD PRESSURE: 110 MMHG | DIASTOLIC BLOOD PRESSURE: 62 MMHG | WEIGHT: 176 LBS

## 2025-05-15 DIAGNOSIS — Z80.3 FAMILY HISTORY OF BREAST CANCER: ICD-10-CM

## 2025-05-15 DIAGNOSIS — N90.4 LICHEN SCLEROSUS ET ATROPHICUS OF THE VULVA: ICD-10-CM

## 2025-05-15 DIAGNOSIS — Z01.411 ENCOUNTER FOR GYNECOLOGICAL EXAMINATION WITH ABNORMAL FINDING: Primary | ICD-10-CM

## 2025-05-15 DIAGNOSIS — Z78.0 POSTMENOPAUSAL: ICD-10-CM

## 2025-05-15 DIAGNOSIS — Z12.31 ENCOUNTER FOR SCREENING MAMMOGRAM FOR MALIGNANT NEOPLASM OF BREAST: ICD-10-CM

## 2025-05-15 DIAGNOSIS — M85.80 OSTEOPENIA AFTER MENOPAUSE: ICD-10-CM

## 2025-05-15 DIAGNOSIS — Z78.0 OSTEOPENIA AFTER MENOPAUSE: ICD-10-CM

## 2025-05-15 PROCEDURE — G0101 CA SCREEN;PELVIC/BREAST EXAM: HCPCS | Performed by: OBSTETRICS & GYNECOLOGY

## 2025-05-15 RX ORDER — BETAMETHASONE DIPROPIONATE 0.5 MG/G
OINTMENT, AUGMENTED TOPICAL
Qty: 50 G | Refills: 1 | Status: SHIPPED | OUTPATIENT
Start: 2025-05-15

## 2025-05-15 RX ORDER — BETAMETHASONE DIPROPIONATE 0.5 MG/G
OINTMENT, AUGMENTED TOPICAL
Qty: 50 G | Refills: 1 | Status: SHIPPED | OUTPATIENT
Start: 2025-05-15 | End: 2025-05-15

## 2025-05-15 NOTE — PATIENT INSTRUCTIONS
Calcium 1200-1500mg + 800-1000 IU Vit D daily unless otherwise directed. Avoid falls. Exercise 150 minutes per week minimum including weight bearing exercises. DEXA scan-  falls prevention      . No further paps after age 65 as long as there has been adequate normal paps completed, no history of ADALGISA 2  or a more severe pap diagnosis in the last 20 years.   Annual mammogram and monthly breast self exam recommended .   Colonoscopy-2023 done      .  Kegels 20 times twice daily. Silicone based lubricant with sex. Vaginal moisturizers twice weekly as needed.

## 2025-05-28 ENCOUNTER — OFFICE VISIT (OUTPATIENT)
Age: 80
End: 2025-05-28
Payer: MEDICARE

## 2025-05-28 ENCOUNTER — TELEPHONE (OUTPATIENT)
Age: 80
End: 2025-05-28

## 2025-05-28 VITALS
BODY MASS INDEX: 32.89 KG/M2 | DIASTOLIC BLOOD PRESSURE: 68 MMHG | HEIGHT: 61 IN | WEIGHT: 174.2 LBS | SYSTOLIC BLOOD PRESSURE: 114 MMHG

## 2025-05-28 DIAGNOSIS — Z86.0101 PERSONAL HISTORY OF ADENOMATOUS AND SERRATED COLON POLYPS: ICD-10-CM

## 2025-05-28 DIAGNOSIS — K58.0 IRRITABLE BOWEL SYNDROME WITH DIARRHEA: ICD-10-CM

## 2025-05-28 DIAGNOSIS — K21.9 GASTROESOPHAGEAL REFLUX DISEASE WITHOUT ESOPHAGITIS: Primary | ICD-10-CM

## 2025-05-28 PROCEDURE — G2211 COMPLEX E/M VISIT ADD ON: HCPCS | Performed by: INTERNAL MEDICINE

## 2025-05-28 PROCEDURE — 99213 OFFICE O/P EST LOW 20 MIN: CPT | Performed by: INTERNAL MEDICINE

## 2025-05-28 RX ORDER — SODIUM CHLORIDE, SODIUM LACTATE, POTASSIUM CHLORIDE, CALCIUM CHLORIDE 600; 310; 30; 20 MG/100ML; MG/100ML; MG/100ML; MG/100ML
125 INJECTION, SOLUTION INTRAVENOUS CONTINUOUS
OUTPATIENT
Start: 2025-05-28

## 2025-05-28 RX ORDER — FAMOTIDINE 20 MG
1 TABLET ORAL EVERY 24 HOURS
COMMUNITY

## 2025-05-28 RX ORDER — ATORVASTATIN CALCIUM 20 MG/1
20 TABLET, FILM COATED ORAL
COMMUNITY
Start: 2025-04-01

## 2025-05-28 NOTE — ASSESSMENT & PLAN NOTE
Diarrhea when he eats the wrong things for work.  Tries to watch her diet but small dose of cannabis seems to do better than anything else  -Continue to watch diet and avoid trigger foods  -Okay to use cannabis Gummies as needed

## 2025-05-28 NOTE — ASSESSMENT & PLAN NOTE
Doing well on omeprazole once a day in the morning.  Has breakthrough heartburn if does not take  -Continue omeprazole  -Follow-up office visit 1 year

## 2025-05-28 NOTE — PROGRESS NOTES
"Name: Noreen Fiore      : 1945      MRN: [id number]  Encounter Provider: Jason Lino MD  Encounter Date: 2025   Encounter department: [facility] [place]  :  Assessment & Plan  Gastroesophageal reflux disease without esophagitis  Doing well on omeprazole once a day in the morning.  Has breakthrough heartburn if does not take  -Continue omeprazole  -Follow-up office visit 1 year       Irritable bowel syndrome with diarrhea  Diarrhea when he eats the wrong things for work.  Tries to watch her diet but small dose of cannabis seems to do better than anything else  -Continue to watch diet and avoid trigger foods  -Okay to use cannabis Gummies as needed       Personal history of adenomatous and serrated colon polyps  Last colonoscopy 2023 with large polyp removed.  1 year recall recommended  Orders:    Colonoscopy; Future        History of Present Illness   HPI  Noreen Fiore is a 79 y.o. female who presents for follow-up.  From a GERD standpoint she is doing well.  She continues to take omeprazole 40 mg once a day.  She denies any heartburn or indigestion.  Denies any dysphagia, odynophagia, early satiety.  She misses a dose of omeprazole she does have breakthrough symptoms.  From a lower GI standpoint she has intermittent diarrhea which she attributes to her irritable bowel syndrome.  She reports if she eats the wrong things and lunch she will have some diarrhea and crampy pain in the afternoon.  Does think nerves exacerbate her symptoms as well.  She notes that if she takes a small amount of cannabis gummy when the symptoms start they resolve relatively quickly.  She denies any GI bleeding.  Her weight is stable.  History obtained from: patient         Objective   /68   Ht 5' 0.5\" (1.537 m)   Wt 79 kg (174 lb 3.2 oz)   BMI 33.46 kg/m²      Physical Exam  General appearance: alert, appears stated age and cooperative  Eyes: PERLLA, EOMI, no icterus "   Head: Normocephalic, without obvious abnormality, atraumatic  Lungs: clear to auscultation bilaterally  Heart: regular rate and rhythm, S1, S2 normal, no murmur, click, rub or gallop  Abdomen: soft, non-tender; bowel sounds normal; no masses,  no organomegaly  Extremities: extremities normal, atraumatic, no cyanosis or edema  Neurologic: Grossly normal

## 2025-05-28 NOTE — TELEPHONE ENCOUNTER
Scheduled date of colonoscopy (as of today): 6/27/2025  Physician performing colonoscopy: JANNY  Location of colonoscopy: [facility]  Bowel prep reviewed with patient: Manuel/Albaro  Instructions reviewed with patient by: KARELY  Clearances: N

## 2025-05-28 NOTE — ASSESSMENT & PLAN NOTE
Last colonoscopy November 2023 with large polyp removed.  1 year recall recommended  Orders:    Colonoscopy; Future

## 2025-06-27 ENCOUNTER — ANESTHESIA (OUTPATIENT)
Dept: GASTROENTEROLOGY | Facility: AMBULATORY SURGERY CENTER | Age: 80
End: 2025-06-27

## 2025-06-27 ENCOUNTER — HOSPITAL ENCOUNTER (OUTPATIENT)
Dept: GASTROENTEROLOGY | Facility: AMBULATORY SURGERY CENTER | Age: 80
Discharge: HOME | End: 2025-06-27
Attending: INTERNAL MEDICINE
Payer: MEDICARE

## 2025-06-27 ENCOUNTER — ANESTHESIA EVENT (OUTPATIENT)
Dept: GASTROENTEROLOGY | Facility: AMBULATORY SURGERY CENTER | Age: 80
End: 2025-06-27

## 2025-06-27 VITALS
RESPIRATION RATE: 24 BRPM | SYSTOLIC BLOOD PRESSURE: 114 MMHG | HEIGHT: 60 IN | DIASTOLIC BLOOD PRESSURE: 57 MMHG | OXYGEN SATURATION: 95 % | BODY MASS INDEX: 34.16 KG/M2 | TEMPERATURE: 98 F | WEIGHT: 174 LBS | HEART RATE: 56 BPM

## 2025-06-27 DIAGNOSIS — Z86.0101 PERSONAL HISTORY OF ADENOMATOUS AND SERRATED COLON POLYPS: ICD-10-CM

## 2025-06-27 PROCEDURE — G0105 COLORECTAL SCRN; HI RISK IND: HCPCS | Performed by: INTERNAL MEDICINE

## 2025-06-27 RX ORDER — SODIUM CHLORIDE, SODIUM LACTATE, POTASSIUM CHLORIDE, CALCIUM CHLORIDE 600; 310; 30; 20 MG/100ML; MG/100ML; MG/100ML; MG/100ML
125 INJECTION, SOLUTION INTRAVENOUS CONTINUOUS
Status: DISCONTINUED | OUTPATIENT
Start: 2025-06-27 | End: 2025-07-01 | Stop reason: HOSPADM

## 2025-06-27 RX ORDER — PROPOFOL 10 MG/ML
INJECTION, EMULSION INTRAVENOUS CONTINUOUS PRN
Status: DISCONTINUED | OUTPATIENT
Start: 2025-06-27 | End: 2025-06-27

## 2025-06-27 RX ORDER — PROPOFOL 10 MG/ML
INJECTION, EMULSION INTRAVENOUS AS NEEDED
Status: DISCONTINUED | OUTPATIENT
Start: 2025-06-27 | End: 2025-06-27

## 2025-06-27 RX ORDER — LIDOCAINE HYDROCHLORIDE 10 MG/ML
INJECTION, SOLUTION EPIDURAL; INFILTRATION; INTRACAUDAL; PERINEURAL AS NEEDED
Status: DISCONTINUED | OUTPATIENT
Start: 2025-06-27 | End: 2025-06-27

## 2025-06-27 RX ORDER — SODIUM CHLORIDE, SODIUM LACTATE, POTASSIUM CHLORIDE, CALCIUM CHLORIDE 600; 310; 30; 20 MG/100ML; MG/100ML; MG/100ML; MG/100ML
50 INJECTION, SOLUTION INTRAVENOUS CONTINUOUS
Status: DISCONTINUED | OUTPATIENT
Start: 2025-06-27 | End: 2025-07-01 | Stop reason: HOSPADM

## 2025-06-27 RX ADMIN — PROPOFOL 80 MG: 10 INJECTION, EMULSION INTRAVENOUS at 13:58

## 2025-06-27 RX ADMIN — PROPOFOL 100 MCG/KG/MIN: 10 INJECTION, EMULSION INTRAVENOUS at 13:58

## 2025-06-27 RX ADMIN — LIDOCAINE HYDROCHLORIDE 50 MG: 10 INJECTION, SOLUTION EPIDURAL; INFILTRATION; INTRACAUDAL; PERINEURAL at 13:58

## 2025-06-27 RX ADMIN — SODIUM CHLORIDE, SODIUM LACTATE, POTASSIUM CHLORIDE, CALCIUM CHLORIDE 50 ML/HR: 600; 310; 30; 20 INJECTION, SOLUTION INTRAVENOUS at 13:53

## 2025-06-27 NOTE — H&P
History and Physical -  [facility]  Noreen Fiore 79 y.o. female MRN: [id number]    HPI: Noreen Fiore is a 79 y.o. year old female who presents for colonoscopy secondary to personal history of polyps    REVIEW OF SYSTEMS: Per the HPI, and otherwise unremarkable.    Historical Information   Past Medical History[1]  Past Surgical History[2]  Social History   Social History     Substance and Sexual Activity   Alcohol Use Not Currently     Social History     Substance and Sexual Activity   Drug Use Never     Tobacco Use History[3]  Family History[4]    Meds/Allergies     Current Medications[5]    Allergies[6]    Objective     There were no vitals taken for this visit.    PHYSICAL EXAM    Gen: NAD AAOx3  Head: Normocephalic, Atraumatic  CV: S1S2 RRR no m/r/g  CHEST: Clear b/l no c/r/w  ABD: soft, +BS NT/ND  EXT: no edema    ASSESSMENT/PLAN:  This is a 79 y.o. year old female here for colonoscopy secondary to personal history of polyps, and she is stable and optimized for her procedure.             [1]   Past Medical History:  Diagnosis Date    Anxiety     Atrial tachycardia (HCC)     Bundle branch block     Cataracts, bilateral     Colon polyp     Depression     Disease of thyroid gland     GERD (gastroesophageal reflux disease)     H/O gastroesophageal reflux (GERD)     History of screening mammography 2019    Bi-Rads 1.    Hypothyroidism     Irritable bowel syndrome     Lichen sclerosus     Miscarriage     Osteopenia     prior tx w  [facility]    LATRICE (stress urinary incontinence, female)     mild    Varicella childhood   [2]   Past Surgical History:  Procedure Laterality Date    BREAST BIOPSY      CATARACT EXTRACTION      CATARACT EXTRACTION BILATERAL W/ ANTERIOR VITRECTOMY  2023     SECTION N/A     CHOLECYSTECTOMY N/A 2016    COLONOSCOPY N/A     DILATION AND CURETTAGE OF UTERUS N/A     TONSILECTOMY AND ADNOIDECTOMY N/A    [3]   Social History  Tobacco Use    Smoking Status Never    Passive exposure: Never   Smokeless Tobacco Never   [4]   Family History  Adopted: Yes   Problem Relation Name Age of Onset    Uterine cancer Mother Litzy Obrien 40    Breast cancer Mother Litzy Obrien 56    Ovarian cancer Neg Hx      Colon cancer Neg Hx     [5]   Current Outpatient Medications:     atorvastatin (LIPITOR) 20 mg tablet    escitalopram (LEXAPRO) 5 mg tablet    levothyroxine 75 mcg tablet    metoprolol tartrate (LOPRESSOR) 25 mg tablet    omeprazole (PriLOSEC) 40 MG capsule    Vitamin D, Cholecalciferol, 25 MCG (1000 UT) CAPS    betamethasone, augmented, (DIPROLENE) 0.05 % ointment    famotidine (PEPCID) 40 MG tablet    Current Facility-Administered Medications:     lactated ringers infusion, 50 mL/hr, Intravenous, Continuous    lactated ringers infusion, 125 mL/hr, Intravenous, Continuous  [6]   Allergies  Allergen Reactions    Penicillins Hives

## 2025-06-27 NOTE — ANESTHESIA PREPROCEDURE EVALUATION
Procedure:  COLONOSCOPY    Relevant Problems   CARDIO   (+) Atrial tachycardia (HCC)      ENDO   (+) Hypothyroidism      GI/HEPATIC   (+) Gastroesophageal reflux disease without esophagitis   (+) Rectal bleeding      NEURO/PSYCH   (+) Anxiety        Physical Exam    Airway     Mallampati score: II  TM Distance: >3 FB  Neck ROM: full      Cardiovascular      Dental   No notable dental hx     Pulmonary      Neurological      Other Findings  post-pubertal.      Anesthesia Plan  ASA Score- 2     Anesthesia Type- IV sedation with anesthesia with ASA Monitors.         Additional Monitors:     Airway Plan: natural airway.           Plan Factors-Exercise tolerance (METS): >4 METS.    Chart reviewed.    Patient summary reviewed.    Patient is not a current smoker.              Induction- intravenous.    Postoperative Plan- .   Monitoring Plan - Monitoring plan - standard ASA monitoring          Informed Consent- Anesthetic plan and risks discussed with patient.  I personally reviewed this patient with the CRNA. Discussed and agreed on the Anesthesia Plan with the CRNA..      NPO Status:  Vitals Value Taken Time   Date of last liquid 06/27/25 06/27/25 13:42   Time of last liquid 1000 06/27/25 13:42   Date of last solid 06/25/25 06/27/25 13:42   Time of last solid 1900 06/27/25 13:42

## 2025-06-27 NOTE — ANESTHESIA POSTPROCEDURE EVALUATION
Post-Op Assessment Note    CV Status:  Stable  Pain Score: 0    Pain management: adequate       Mental Status:  Awake and sleepy   Hydration Status:  Stable   PONV Controlled:  None   Airway Patency:  Patent     Post Op Vitals Reviewed: Yes    No anethesia notable event occurred.    Staff: CRNA           Last Filed PACU Vitals:  Vitals Value Taken Time   Temp     Pulse 59    BP 11/57    Resp 15    SpO2 97%               
07-Feb-2022 19:00